# Patient Record
Sex: FEMALE | Employment: FULL TIME | URBAN - METROPOLITAN AREA
[De-identification: names, ages, dates, MRNs, and addresses within clinical notes are randomized per-mention and may not be internally consistent; named-entity substitution may affect disease eponyms.]

---

## 2019-03-26 ENCOUNTER — EVALUATION (OUTPATIENT)
Dept: PHYSICAL THERAPY | Facility: REHABILITATION | Age: 49
End: 2019-03-26
Payer: COMMERCIAL

## 2019-03-26 ENCOUNTER — TRANSCRIBE ORDERS (OUTPATIENT)
Dept: PHYSICAL THERAPY | Facility: REHABILITATION | Age: 49
End: 2019-03-26

## 2019-03-26 DIAGNOSIS — M54.16 LUMBAR RADICULOPATHY: Primary | ICD-10-CM

## 2019-03-26 PROCEDURE — 97112 NEUROMUSCULAR REEDUCATION: CPT | Performed by: PHYSICAL THERAPIST

## 2019-03-26 PROCEDURE — 97161 PT EVAL LOW COMPLEX 20 MIN: CPT | Performed by: PHYSICAL THERAPIST

## 2019-03-26 RX ORDER — AMLODIPINE BESYLATE AND ATORVASTATIN CALCIUM 5; 10 MG/1; MG/1
1 TABLET, FILM COATED ORAL DAILY
COMMUNITY

## 2019-03-26 RX ORDER — LOSARTAN POTASSIUM 25 MG/1
25 TABLET ORAL DAILY
COMMUNITY

## 2019-03-26 NOTE — LETTER
2019    [address] [address]    Patient: Dex Morales   YOB: 1970   Date of Visit: 3/26/2019     Encounter Diagnosis     ICD-10-CM    1  Lumbar radiculopathy M54 16        Dear Dr Hernandez Current:    Please review the attached Plan of Care from Nick Olivares's recent visit  Please verify that you agree therapy should continue by signing the attached document and sending it back to our office  If you have any questions or concerns, please don't hesitate to call  Sincerely,    Jose Solares, PT      Referring Provider:      I certify that I have read the below Plan of Care and certify the need for these services furnished under this plan of treatment while under my care  [address] SteinBanner P[facility] Centerbrandie [address] [place] [address] Yuni Freedo 1213: [phone]          PT Evaluation     Today's date: 3/26/2019  Patient name: Dex Morales  : 1970  MRN: [id number]  Referring provider: Shireen Luther  Dx:   Encounter Diagnosis     ICD-10-CM    1  Lumbar radiculopathy M54 16        Start Time: 3756  Stop Time: 2335  Total time in clinic (min): 50 minutes    Assessment  Assessment details: Dex Morales is a 52 y o  female present with:   Lumbar radiculopathy  (primary encounter diagnosis)    [facility] presenting with decreased abdominal strength as well as quadriceps flexibility contributing to her low back pain  She has the above listed impairments and will benefit from skilled Physical Therapist management to improve deficits to return to prior level of function     Impairments: abnormal muscle firing, abnormal or restricted ROM, activity intolerance, impaired physical strength, lacks appropriate home exercise program and pain with function  Understanding of Dx/Px/POC: good   Prognosis: good    Goals  Impairment Goals  - Decrease pain 0/10  - Improve ROM within lumbar spine to touch ground   - Increase strength to 5/5 throughout    Functional Goals  - Return to Prior Level of Function  - Increase Functional Status Measure to: 79  - Patient will be independent with HEP  -Patient will be able to return to bowling without pain    Plan  Patient would benefit from: skilled PT  Planned therapy interventions: joint mobilization, manual therapy, patient education, postural training, activity modification, abdominal trunk stabilization, body mechanics training, flexibility, functional ROM exercises, graded exercise, home exercise program, neuromuscular re-education, strengthening, stretching, therapeutic activities and therapeutic exercise  Frequency: 2x week  Duration in weeks: 8  Plan of Care beginning date: 3/26/2019  Plan of Care expiration date: 2019  Treatment plan discussed with: patient        Subjective Evaluation    History of Present Illness  Mechanism of injury: Leopold Harbour is a 52 y o  female presenting with LBP and Radiculopathy which began "years ago"  Notes she has had years of leg pain, notes MRI performed finally discovering bulging discs  Injections performed a few weeks ago, with relief of her leg pain  Mechanism of injury No  Pain is increased with picking up objects off floor, sitting too long  Pt would like to return to being able to bowl without pain       Pain  Current pain ratin  At worst pain ratin  Location: lumbar   Quality: dull ache  Relieving factors: change in position  Aggravating factors: lifting  Progression: improved    Social Support    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[employer])  Treatments  Previous treatment: injection treatment  Current treatment: physical therapy  Patient Goals  Patient goals for therapy: decreased pain, increased motion, increased strength and independence with ADLs/IADLs          Objective     Concurrent Complaints  Negative for night pain, bladder dysfunction, bowel dysfunction, saddle (S4) numbness, history of cancer and history of trauma    Active Range of Motion   Cervical/Thoracic Spine       Thoracic    Flexion:  WFL    Lumbar   Flexion:  with pain Restriction level: minimal  Extension:  with pain Restriction level: minimal  Left rotation:  with pain Restriction level: moderate  Right rotation:  Restriction level: minimal    Additional Active Range of Motion Details  Rotation left 50% rotation right 75% within normal limits  Able to reach within 5" of floor after 2 attemtps      Passive Range of Motion     Lumbar   Left rotation:  Restriction level: moderate  Right rotation:  Restriction level: minimal    Strength/Myotome Testing     Left Hip   Planes of Motion   Abduction: 4-  Adduction: 4-    Right Hip   Planes of Motion   Abduction: 4-  Adduction: 4-    Tests     Lumbar     Left   Negative crossed SLR and passive SLR  Right   Negative crossed SLR and passive SLR  Left Hip   Negative CURT and FADIR  Right Hip   Positive CURT  Negative FADIR               Daily Treatment Diary   Precautions: htn, osteoperosis     Daily Treatment Diary      Manual 03/26/19             Prone PA lumbar spine nv            STM erector spinae nv            Exercise Diary                     bike nv                   ADIM* 5x5"                   ADIM marches* 2x10ea                   ADIM heel taps                    Bridges* 3x12                   Overhead lat stretch nv                   Low Rows nv                   TG nv                   Goblet squat                    Pallof Press                                                                                                                                                                                                                                     Modalities

## 2019-03-26 NOTE — PROGRESS NOTES
PT Evaluation     Today's date: 3/26/2019  Patient name: Sandi Mg  : 1970  MRN: [id number]  Referring provider: Jaclyn Xie  Dx:   Encounter Diagnosis     ICD-10-CM    1  Lumbar radiculopathy M54 16        Start Time: 815  Stop Time: 258  Total time in clinic (min): 50 minutes    Assessment  Assessment details: Sandi Mg is a 52 y o  female present with:   Lumbar radiculopathy  (primary encounter diagnosis)    [place] presenting with decreased abdominal strength as well as quadriceps flexibility contributing to her low back pain  She has the above listed impairments and will benefit from skilled Physical Therapist management to improve deficits to return to prior level of function     Impairments: abnormal muscle firing, abnormal or restricted ROM, activity intolerance, impaired physical strength, lacks appropriate home exercise program and pain with function  Understanding of Dx/Px/POC: good   Prognosis: good    Goals  Impairment Goals  - Decrease pain 0/10  - Improve ROM within lumbar spine to touch ground   - Increase strength to 5/5 throughout    Functional Goals  - Return to Prior Level of Function  - Increase Functional Status Measure to: 79  - Patient will be independent with HEP  -Patient will be able to return to bowling without pain    Plan  Patient would benefit from: skilled PT  Planned therapy interventions: joint mobilization, manual therapy, patient education, postural training, activity modification, abdominal trunk stabilization, body mechanics training, flexibility, functional ROM exercises, graded exercise, home exercise program, neuromuscular re-education, strengthening, stretching, therapeutic activities and therapeutic exercise  Frequency: 2x week  Duration in weeks: 8  Plan of Care beginning date: 3/26/2019  Plan of Care expiration date: 2019  Treatment plan discussed with: patient        Subjective Evaluation    History of Present Illness  Mechanism of injury: Pradeep Lieberman is a 52 y o  female presenting with LBP and Radiculopathy which began "years ago"  Notes she has had years of leg pain, notes MRI performed finally discovering bulging discs  Injections performed a few weeks ago, with relief of her leg pain  Mechanism of injury No  Pain is increased with picking up objects off floor, sitting too long  Pt would like to return to being able to bowl without pain  Pain  Current pain ratin  At worst pain ratin  Location: lumbar   Quality: dull ache  Relieving factors: change in position  Aggravating factors: lifting  Progression: improved    Social Support    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[employer])  Treatments  Previous treatment: injection treatment  Current treatment: physical therapy  Patient Goals  Patient goals for therapy: decreased pain, increased motion, increased strength and independence with ADLs/IADLs          Objective     Concurrent Complaints  Negative for night pain, bladder dysfunction, bowel dysfunction, saddle (S4) numbness, history of cancer and history of trauma    Active Range of Motion   Cervical/Thoracic Spine       Thoracic    Flexion:  WFL    Lumbar   Flexion:  with pain Restriction level: minimal  Extension:  with pain Restriction level: minimal  Left rotation:  with pain Restriction level: moderate  Right rotation:  Restriction level: minimal    Additional Active Range of Motion Details  Rotation left 50% rotation right 75% within normal limits  Able to reach within 5" of floor after 2 attemtps      Passive Range of Motion     Lumbar   Left rotation:  Restriction level: moderate  Right rotation:  Restriction level: minimal    Strength/Myotome Testing     Left Hip   Planes of Motion   Abduction: 4-  Adduction: 4-    Right Hip   Planes of Motion   Abduction: 4-  Adduction: 4-    Tests     Lumbar     Left   Negative crossed SLR and passive SLR  Right   Negative crossed SLR and passive SLR       Left Hip Negative CURT and FADIR  Right Hip   Positive CURT  Negative FADIR               Daily Treatment Diary   Precautions: htn, osteoperosis     Daily Treatment Diary      Manual 03/26/19             Prone PA lumbar spine nv            STM erector spinae nv            Exercise Diary                     bike nv                   ADIM* 5x5"                   ADIM marches* 2x10ea                   ADIM heel taps                    Bridges* 3x12                   Overhead lat stretch nv                   Low Rows nv                   TG nv                   Goblet squat                    Pallof Press                                                                                                                                                                                                                                     Modalities

## 2019-04-02 ENCOUNTER — OFFICE VISIT (OUTPATIENT)
Dept: PHYSICAL THERAPY | Facility: REHABILITATION | Age: 49
End: 2019-04-02
Payer: COMMERCIAL

## 2019-04-02 DIAGNOSIS — M54.16 LUMBAR RADICULOPATHY: Primary | ICD-10-CM

## 2019-04-02 PROCEDURE — 97112 NEUROMUSCULAR REEDUCATION: CPT | Performed by: PHYSICAL THERAPIST

## 2019-04-02 PROCEDURE — 97140 MANUAL THERAPY 1/> REGIONS: CPT | Performed by: PHYSICAL THERAPIST

## 2019-04-02 PROCEDURE — 97110 THERAPEUTIC EXERCISES: CPT | Performed by: PHYSICAL THERAPIST

## 2019-04-09 ENCOUNTER — APPOINTMENT (OUTPATIENT)
Dept: PHYSICAL THERAPY | Facility: REHABILITATION | Age: 49
End: 2019-04-09
Payer: COMMERCIAL

## 2019-04-16 ENCOUNTER — APPOINTMENT (OUTPATIENT)
Dept: PHYSICAL THERAPY | Facility: REHABILITATION | Age: 49
End: 2019-04-16
Payer: COMMERCIAL

## 2019-04-17 ENCOUNTER — OFFICE VISIT (OUTPATIENT)
Dept: PHYSICAL THERAPY | Facility: REHABILITATION | Age: 49
End: 2019-04-17
Payer: COMMERCIAL

## 2019-04-17 DIAGNOSIS — M54.16 LUMBAR RADICULOPATHY: Primary | ICD-10-CM

## 2019-04-17 PROCEDURE — 97112 NEUROMUSCULAR REEDUCATION: CPT | Performed by: PHYSICAL THERAPIST

## 2019-04-17 PROCEDURE — 97110 THERAPEUTIC EXERCISES: CPT | Performed by: PHYSICAL THERAPIST

## 2019-04-18 ENCOUNTER — TRANSCRIBE ORDERS (OUTPATIENT)
Dept: PHYSICAL THERAPY | Facility: REHABILITATION | Age: 49
End: 2019-04-18

## 2019-04-18 DIAGNOSIS — M54.16 LUMBAR RADICULOPATHY: Primary | ICD-10-CM

## 2019-04-23 ENCOUNTER — APPOINTMENT (OUTPATIENT)
Dept: PHYSICAL THERAPY | Facility: REHABILITATION | Age: 49
End: 2019-04-23
Payer: COMMERCIAL

## 2019-04-30 ENCOUNTER — APPOINTMENT (OUTPATIENT)
Dept: PHYSICAL THERAPY | Facility: REHABILITATION | Age: 49
End: 2019-04-30
Payer: COMMERCIAL